# Patient Record
Sex: FEMALE | Race: WHITE
[De-identification: names, ages, dates, MRNs, and addresses within clinical notes are randomized per-mention and may not be internally consistent; named-entity substitution may affect disease eponyms.]

---

## 2019-01-24 ENCOUNTER — HOSPITAL ENCOUNTER (EMERGENCY)
Dept: HOSPITAL 58 - ED | Age: 24
Discharge: HOME | End: 2019-01-24

## 2019-01-24 VITALS — DIASTOLIC BLOOD PRESSURE: 67 MMHG | TEMPERATURE: 98.7 F | SYSTOLIC BLOOD PRESSURE: 112 MMHG

## 2019-01-24 VITALS — BODY MASS INDEX: 23.8 KG/M2

## 2019-01-24 DIAGNOSIS — R11.2: Primary | ICD-10-CM

## 2019-01-24 DIAGNOSIS — R25.2: ICD-10-CM

## 2019-01-24 DIAGNOSIS — Z3A.21: ICD-10-CM

## 2019-01-24 DIAGNOSIS — R10.9: ICD-10-CM

## 2019-01-24 DIAGNOSIS — F41.9: ICD-10-CM

## 2019-01-24 DIAGNOSIS — A08.4: ICD-10-CM

## 2019-01-24 DIAGNOSIS — R63.0: ICD-10-CM

## 2019-01-24 DIAGNOSIS — R19.7: ICD-10-CM

## 2019-01-24 PROCEDURE — 85025 COMPLETE CBC W/AUTO DIFF WBC: CPT

## 2019-01-24 PROCEDURE — 96361 HYDRATE IV INFUSION ADD-ON: CPT

## 2019-01-24 PROCEDURE — 99283 EMERGENCY DEPT VISIT LOW MDM: CPT

## 2019-01-24 PROCEDURE — 80053 COMPREHEN METABOLIC PANEL: CPT

## 2019-01-24 PROCEDURE — 36415 COLL VENOUS BLD VENIPUNCTURE: CPT

## 2019-01-24 PROCEDURE — 81001 URINALYSIS AUTO W/SCOPE: CPT

## 2019-01-24 PROCEDURE — 96365 THER/PROPH/DIAG IV INF INIT: CPT

## 2019-01-24 PROCEDURE — 85007 BL SMEAR W/DIFF WBC COUNT: CPT

## 2019-01-24 NOTE — ED.PDOC
General


ED Provider: 


Dr. NATALIE PRINCE





Chief Complaint: Nausea/Vomiting


Stated Complaint: Patient is a 23 who come to the ER after waking up with Nausea

, vomiting x 10 and diarrhea x12. She is 21 weeks pregnant.


Time Seen by Physician: 05:05


Mode of Arrival: Walk-In


Information Source: Patient, Family


Exam Limitations: No limitations


Nursing and Triage Documentation Reviewed and Agree: Yes


Does patient meet sepsis criteria?: No


System Inflammatory Response Syndrome: Not Applicable


Sepsis Protocol: 


For patient's 13 years and over:





Temp is 96.8 and below  and greater


Pulse >90 BPM


Resp >20/minute


Acutely Altered Mental Status





Are patient's symptoms suggestive of a new infection, such as:


   -Pneumonia


   -Skin, Soft Tissue


   -Endocarditis


   -UTI


   -Bone, Joint Infection


   -Implantable Device


   -Acute Abdominal Infection


   -Wound Infection


   -Meningitis


   -Blood Stream Catheter Infection


   -Unknown








GI Complaint Exam





- Vomiting/Diarrhea Complaint/Exam


Onset/Duration: 3 hours 


Symptoms Are: Still present


Episodes of Vomiting over last 24 Hours: 10


Episodes of Diarrhea Over Last 24 Hours: 12


Initial Severity: Severe


Current Severity: Severe


Character of Vomiting: Reports: Bilious


Character of Diarrhea: Reports: Watery


Aggravating: Reports: Food


Alleviating: Reports: None


Associated Signs and Symptoms: Reports: Cramping


: 2


Para: 1


Recent Positive Pregnancy Test: Yes


Use of Oral Contraceptives: No


Use of Depoprovera: No


Compliant With Contraceptive Use: No


Non-GI Risk Factors: Denies: Vomiting due to neuro, Vomiting due to cardiac


Surgical Obstruction Risk Factors: Reports: None


Related Surgical History: Reports: None


Abdominal Findings: Present: None


Kussmaul Respirations Present: No


Differential Diagnoses: Viral Gastroenteritis, UTI





Review of Systems





- Review Of Systems


Constitutional: Reports: No symptoms


Eyes: Reports: No symptoms


Ears, Nose, Mouth, Throat: Reports: No symptoms


Respiratory: Reports: No symptoms


Cardiac: Reports: No symptoms


GI: Reports: Abdominal pain (cramping ), Nausea, Poor appetite, Poor fluid 

intake, Vomiting


: Reports: No symptoms


Musculoskeletal: Reports: No symptoms


Skin: Reports: No symptoms


Neurological: Reports: Anxiety


Endocrine: Reports: No symptoms


Hematologic/Lymphatic: Reports: No symptoms


All Other Systems: Reviewed and Negative





Past Medical History





- Past Medical History


Previously Healthy: Yes


Endocrine: Reports: None


Cardiovascular: Reports: None


Respiratory: Reports: None


Hematological: Reports: None


Gastrointestinal: Reports: None


Genitourinary: Reports: None


Neuro/Psych: Reports: None


Musculoskeletal: Reports: None


Cancer: Reports: None


Last Menstrual Period: 2018





- Surgical History


General Surgical History: Reports: None





- Family History


Family History: Reports: None





- Social History


Smoking Status: Never smoker


Hx Substance Use: No


Alcohol Screening: None





- Immunizations


Tetanus Shot up to Date:  (UNKNOWN)





Physical Exam





- Physical Exam


Appearance: Ill-appearing


Ill-appearing: Moderate


Pain Distress: Moderate


Eyes: GIFTY, EOMI, Conjunctiva clear


ENT: Ears normal, Nose normal, Oropharynx normal


Neck: Supple


Respiratory: Airway patent, Breath sounds clear, Breath sounds equal, 

Respirations nonlabored


Cardiovascular: RRR, Pulses normal, No rub, No murmur


GI/: Soft, Nontender


Musculoskeletal: Normal strength, ROM intact, No edema, No calf tenderness


Skin: Warm, Dry, Normal color


Neurological: Sensation intact, Motor intact, Cranial nerves intact, Alert, 

Oriented


Psychiatric: Anxious





Critical Care Note





- Critical Care Note


Total Time (mins): 0





Course





- Course


Hematology/Chemistry: 


 19 05:10





 19 05:10


Orders, Labs, Meds: 


Lab Review











  19





  05:10 05:10 07:57


 


WBC  12.30 H  


 


RBC  4.42  


 


Hgb  13.5  


 


Hct  39.5  


 


MCV  89.4  


 


MCH  30.5  


 


MCHC  34.2  


 


RDW Coeff of Shari  13.5  


 


Plt Count  242  


 


Neutrophils % (Manual)  93.0 H  


 


Lymphocytes % (Manual)  5.0 L  


 


Monocytes % (Manual)  2.0  


 


Anisocytosis  Not present  


 


Sodium   138.4 


 


Potassium   3.87 


 


Chloride   104.1 


 


Carbon Dioxide   24.8 


 


Anion Gap   13.37 


 


BUN   9.7 


 


Creatinine   0.50 L 


 


Estimated GFR (MDRD)   150.00 


 


BUN/Creatinine Ratio   19.40 


 


Glucose   94.8 


 


Calcium   9.25 


 


Total Bilirubin   0.54 


 


AST   70.0 H 


 


ALT   21.3 


 


Alkaline Phosphatase   52.9 


 


Total Protein   7.60 


 


Albumin   4.24 


 


Globulin   3.36 


 


Albumin/Globulin Ratio   1.26 


 


Urine Color    Yellow


 


Urine Clarity    Slightly


 


Urine pH    6.0


 


Ur Specific Gravity    >=1.030


 


Urine Protein    Trace


 


Urine Glucose (UA)    Negative


 


Urine Ketones    1+


 


Urine Blood    Negative


 


Urine Nitrite    Negative


 


Urine Bilirubin    Negative


 


Urine Urobilinogen    0.2


 


Ur Leukocyte Esterase    Negative


 


Ur Squamous Epith Cells    


 


Urine Mucus    Trace








Orders











 Category Date Time Status


 


 ED IV/MEDIPORT/POWERPORT .ONCE EMERGENCY  19 04:50 Active


 


 Fetal Heart Tones [ED FETAL HEART RATE] .ONCE EMERGENCY  19 04:51 Active


 


 CBC W/ AUTO DIFF Stat LAB  19 05:10 Completed


 


 COMPREHENSIVE METABOLIC PANEL Stat LAB  19 05:10 Completed


 


 MANUAL DIFFERENTIAL Stat LAB  19 05:10 Completed


 


 URINALYSIS C & S IF INDICATED Stat LAB  19 07:57 Completed


 


 0.9 % Sodium Chloride [Saline Flush] MEDS  19 04:50 Discontinued





 1 syr IVF PRN PRN   


 


 Promethazine HCl [Phenergan 25 mg/ml Vial] MEDS  19 05:09 Discontinued





 25 mg .ROUTE .STK-MED ONE   


 


 Promethazine HCl [Phenergan 25 mg/ml Vial] 25 mg MEDS  19 04:50 

Discontinued





 0.9 % Sodium Chloride [Sodium Chloride] 50 ml   





 IV ONCE   


 


 Ringers Lactated Solution [Lactated Ringers] 1,000 ml MEDS  19 05:04 

Discontinued





 IV BOLUS   


 


 Sodium Chloride 0.9% [Sodium Chloride] 1,000 ml MEDS  19 04:50 

Discontinued





 IV BOLUS   








Medications














Discontinued Medications














Generic Name Dose Route Start Last Admin





  Trade Name Freq  PRN Reason Stop Dose Admin


 


Promethazine HCl 25 mg/ Sodium  51 mls @ 75 mls/hr  19 04:50  19 05:

14





  Chloride  IV  19 05:30  75 mls/hr





  ONCE STA   Administration





     





     





     





     


 


Sodium Chloride  1,000 mls @ 1,000 mls/hr  19 04:50  19 05:11





  Sodium Chloride  IV  19 05:49  1,000 mls/hr





  BOLUS STA   Administration





     





     





     





     


 


Lactated Ringer's  1,000 mls @ 1,000 mls/hr  19 05:04  19 06:53





  Lactated Ringers  IV  19 06:03  1,000 mls/hr





  BOLUS STA   Administration





     





     





     





     


 


Sodium Chloride  1 syr  19 04:50  19 05:16





  Saline Flush  IVF   1 syr





  PRN PRN   Administration





  To flush IV   





     





     





     











Vital Signs: 


 











  Temp Pulse Resp BP Pulse Ox


 


 19 04:47  98.7 F  69  20  112/67  98














Departure





- Departure


Time of Disposition: 08:30


Disposition: HOME SELF-CARE


Discharge Problem: 


 Nausea, Vomiting, Viral gastroenteritis





Instructions:  Gastroenteritis (ED)


Condition: Stable


Pt referred to PMD for follow-up: Yes


IPMP verified?: No


Additional Instructions: 


Push fluids 


Follow up with PCP in 3 days 


Prescriptions: 


Promethazine HCl [Phenergan Tab] 25 mg PO Q6H PRN #15 tablet


 PRN Reason: Nausea / Vomiting


Allergies/Adverse Reactions: 


Allergies





No Known Drug Allergies Adverse Reaction (Verified 19 04:56)


 








Home Medications: 


Ambulatory Orders





Pnv No.95/Ferrous Fum/Folic AC [Prenavite Tablet] 1 each PO DAILY 19 


Promethazine HCl [Phenergan Tab] 25 mg PO Q6H PRN #15 tablet 19 








Disposition Discussed With: Patient, Family